# Patient Record
(demographics unavailable — no encounter records)

---

## 2025-03-07 NOTE — PHYSICAL EXAM
[de-identified] : Physical exam shows a younger appearing than stated age very pleasant female 72-year-old very pleasant good historian.  She has the most best recreation of her pain with palpation overlying the greater trochanter which seems very tender and she says is the thing that bothers her the most.  She does not really report that that pain does radiate into the groin however it does not on exam today while I am rotating her hip.  She can allow hip flexion to 90 degrees and in this position internal rotation of 10 causes her the slightest discomfort.  External Tatian is tolerated to about 55/60 degrees.  She does report difficulty with her shoes and socks but does manage.  Knee exam and alignment grossly benign.  Straight leg raise test is negative today.

## 2025-03-07 NOTE — HISTORY OF PRESENT ILLNESS
[de-identified] : Ms. Juarez is a very pleasant 72-year-old female who unfortunately did not get x-rays prior to be being seen today.  In addition though we are on time in clinic she is on a time constraint and is unable to get films completed today prior to being able to leave but wanted to be seen anyway.  She describes pain overlying the lateral aspect of her hip which bothers her at night that is consistent with bursitis however this does radiate somewhat into her groin at times.  Majority of it seems to be more laterally based though she occasionally even has some different pain that radiates from her buttock to her posterior thigh and then which she says can go all the way to her toes.  She does have a significant history of previous lumbar decompression and by her report fusion though I have no records available.  She relays a history of being in the hospital for the lower lumbar fusion and having a fall was worked up for hip discomfort at that time but says that since that fall she has had persistent pain around the hip and both the soft tissue is laterally as well as in the groin.  She apparently was worked up for any type of injury at that time including both plain films and an MRI and a and by her report those were all negative.  Again I have no records to review.  She is told me today that she is not a patient would like to take any type of medicines unless it is extremely necessary.  She is currently undergoing separate treatment for by her report a lung cancer though she has a history of breast cancer which was treated so uncertain as to without this is actually a metastatic lesion from the breast cancer that is being treated.  Again no records to review.  There is also nothing in her [employer] chart.

## 2025-03-07 NOTE — ASSESSMENT
[FreeTextEntry1] : Impression: In the lack of being able to see x-rays today I did review her chart and PACS report 5 years ago she did have a lumbar spine series that did show some glimpses of her hips and at that time both both appeared relatively normal on both the lateral image of the lower spine as well as the AP.  On the AP unfortunately of the right hip is the hip that is less well-visualized compared to the left.  I explained her then appropriate where we would consider wanting x-rays to be done  However, in further discussion with her she very much does not want to take any type of pills unless is extremely necessary as of already said once in this note.  I talked her about anti-inflammatory medications as well as physical therapy.  She is willing to do the PT but does not wish to take the medic the medication.  I have explained her that I think it would be very important for her to run that Celebrex medication past her chemotherapy physicians to try to be sure that they are aware and that they would approve the drug especially given the fact that there is some potential for liver side effects and I know that that is probably what they are likely following with labs with respect to chemo.  I had again have no access to rate records.  I did explain to her that are for steps in treating bursitis as well as osteoarthritis would likely be very similar with some exercises and physical therapy as well as anti-inflammatories sore following the road that I think would work for both of these problems.  She is aware is very happy with going to [facility] and seeing David and apparently it has been there before.  She is given a prescription for PT can call me if she wants to try the medication otherwise continue observation if her symptoms worsen certainly I would suggest she come back at which time we would really want to have those x-rays that we ordered and wanted to have done for today completed.  Please excuse any errors in this notice is completed with Dragon software.